# Patient Record
Sex: FEMALE | Race: WHITE | NOT HISPANIC OR LATINO | Employment: FULL TIME | URBAN - METROPOLITAN AREA
[De-identification: names, ages, dates, MRNs, and addresses within clinical notes are randomized per-mention and may not be internally consistent; named-entity substitution may affect disease eponyms.]

---

## 2020-09-14 ENCOUNTER — HOSPITAL ENCOUNTER (OUTPATIENT)
Dept: LAB | Facility: HOSPITAL | Age: 30
Discharge: HOME | End: 2020-09-14
Attending: NURSE PRACTITIONER

## 2020-09-14 LAB
25(OH)D3 SERPL-MCNC: 46.1 NG/ML (ref 30–100)
ALBUMIN SERPL-MCNC: 4.1 G/DL (ref 3.5–5)
ALBUMIN/GLOB SERPL: 1.2 {RATIO} (ref 1.4–2.6)
ALP SERPL-CCNC: 79 U/L (ref 42–98)
ALT SERPL-CCNC: 21 U/L (ref 10–40)
ANION GAP SERPL CALC-SCNC: 18 MMOL/L (ref 8–19)
AST SERPL-CCNC: 24 U/L (ref 15–50)
BASOPHILS # BLD AUTO: 0.02 10*3/UL (ref 0–0.2)
BASOPHILS NFR BLD AUTO: 0.3 % (ref 0–3)
BILIRUB SERPL-MCNC: 0.3 MG/DL (ref 0.2–1.3)
BUN SERPL-MCNC: 11 MG/DL (ref 5–25)
BUN/CREAT SERPL: 14 {RATIO} (ref 6–20)
CALCIUM SERPL-MCNC: 9.3 MG/DL (ref 8.7–10.4)
CHLORIDE SERPL-SCNC: 103 MMOL/L (ref 99–111)
CHOLEST SERPL-MCNC: 204 MG/DL (ref 107–200)
CHOLEST/HDLC SERPL: 5.1 {RATIO} (ref 3–6)
CONV ABS IMM GRAN: 0.02 10*3/UL (ref 0–0.2)
CONV CO2: 22 MMOL/L (ref 22–32)
CONV IMMATURE GRAN: 0.3 % (ref 0–1.8)
CONV TOTAL PROTEIN: 7.5 G/DL (ref 6.3–8.2)
CREAT UR-MCNC: 0.77 MG/DL (ref 0.5–0.9)
DEPRECATED RDW RBC AUTO: 42.6 FL (ref 36.4–46.3)
EOSINOPHIL # BLD AUTO: 0.09 10*3/UL (ref 0–0.7)
EOSINOPHIL # BLD AUTO: 1.2 % (ref 0–7)
ERYTHROCYTE [DISTWIDTH] IN BLOOD BY AUTOMATED COUNT: 13.1 % (ref 11.7–14.4)
EST. AVERAGE GLUCOSE BLD GHB EST-MCNC: 103 MG/DL
FOLATE SERPL-MCNC: 7.7 NG/ML (ref 4.8–20)
GFR SERPLBLD BASED ON 1.73 SQ M-ARVRAT: >60 ML/MIN/{1.73_M2}
GLOBULIN UR ELPH-MCNC: 3.4 G/DL (ref 2–3.5)
GLUCOSE SERPL-MCNC: 85 MG/DL (ref 65–99)
HBA1C MFR BLD: 5.2 % (ref 3.5–5.7)
HCT VFR BLD AUTO: 42.7 % (ref 37–47)
HDLC SERPL-MCNC: 40 MG/DL (ref 40–60)
HGB BLD-MCNC: 13.3 G/DL (ref 12–16)
IRON SERPL-MCNC: 58 UG/DL (ref 60–170)
LDLC SERPL CALC-MCNC: 143 MG/DL (ref 70–100)
LYMPHOCYTES # BLD AUTO: 3.02 10*3/UL (ref 1–5)
LYMPHOCYTES NFR BLD AUTO: 40.4 % (ref 20–45)
MCH RBC QN AUTO: 27.6 PG (ref 27–31)
MCHC RBC AUTO-ENTMCNC: 31.1 G/DL (ref 33–37)
MCV RBC AUTO: 88.6 FL (ref 81–99)
MONOCYTES # BLD AUTO: 0.45 10*3/UL (ref 0.2–1.2)
MONOCYTES NFR BLD AUTO: 6 % (ref 3–10)
NEUTROPHILS # BLD AUTO: 3.87 10*3/UL (ref 2–8)
NEUTROPHILS NFR BLD AUTO: 51.8 % (ref 30–85)
NRBC CBCN: 0 % (ref 0–0.7)
OSMOLALITY SERPL CALC.SUM OF ELEC: 287 MOSM/KG (ref 273–304)
PLATELET # BLD AUTO: 248 10*3/UL (ref 130–400)
PMV BLD AUTO: 11.5 FL (ref 9.4–12.3)
POTASSIUM SERPL-SCNC: 4.3 MMOL/L (ref 3.5–5.3)
RBC # BLD AUTO: 4.82 10*6/UL (ref 4.2–5.4)
SODIUM SERPL-SCNC: 139 MMOL/L (ref 135–147)
TRIGL SERPL-MCNC: 107 MG/DL (ref 40–150)
TSH SERPL-ACNC: 0.26 M[IU]/L (ref 0.27–4.2)
VIT B12 SERPL-MCNC: 475 PG/ML (ref 211–911)
VLDLC SERPL-MCNC: 21 MG/DL (ref 5–37)
WBC # BLD AUTO: 7.47 10*3/UL (ref 4.8–10.8)

## 2020-09-15 ENCOUNTER — HOSPITAL ENCOUNTER (OUTPATIENT)
Dept: LAB | Facility: HOSPITAL | Age: 30
Discharge: HOME | End: 2020-09-15
Attending: NURSE PRACTITIONER

## 2020-10-05 ENCOUNTER — CONVERSION ENCOUNTER (OUTPATIENT)
Dept: SURGERY | Facility: CLINIC | Age: 30
End: 2020-10-05

## 2020-10-05 ENCOUNTER — OFFICE VISIT CONVERTED (OUTPATIENT)
Dept: SURGERY | Facility: CLINIC | Age: 30
End: 2020-10-05
Attending: SURGERY

## 2020-10-10 ENCOUNTER — HOSPITAL ENCOUNTER (OUTPATIENT)
Dept: PREADMISSION TESTING | Facility: HOSPITAL | Age: 30
Discharge: HOME | End: 2020-10-10
Attending: SURGERY

## 2020-10-10 LAB — SARS-COV-2 RNA SPEC QL NAA+PROBE: NOT DETECTED

## 2020-10-15 ENCOUNTER — HOSPITAL ENCOUNTER (OUTPATIENT)
Dept: PERIOP | Facility: HOSPITAL | Age: 30
Setting detail: HOSPITAL OUTPATIENT SURGERY
Discharge: HOME | End: 2020-10-15
Attending: SURGERY

## 2020-10-15 LAB — HCG UR QL: NEGATIVE

## 2020-11-02 ENCOUNTER — OFFICE VISIT CONVERTED (OUTPATIENT)
Dept: SURGERY | Facility: CLINIC | Age: 30
End: 2020-11-02
Attending: SURGERY

## 2021-05-10 NOTE — H&P
History and Physical      Patient Name: Mahesh Barahona   Patient ID: [id number]   Sex: Female   YOB: 1990        Visit Date: 2020    Provider: Husam Pack MD   Location: [facility]   Location Address: [address] [place] [address]  [id number]   Location Phone: [phone]          Chief Complaint  · Outpatient History & Physical / Surgical Orders  · Gallbladder Consult      History Of Present Illness  Mahesh Barahona is a 29 year old /White female who presents to the office today as a consult from [facility] ER Physician MD.      Patient was referred for gallstones.  Patient went to the emergency room on 10/2/2020 for severe epigastric pain and right upper quadrant pain.  She had an abdominal ultrasound done that showed gallstones and a 5.5 mm common bile duct with no obvious choledocholithiasis but there was intrahepatic biliary dilatation.  Labs showed white count at about 12.2 and LFTs were not elevated.  She went home from the emergency room.  She was prescribed pain medications.  She has no longer needing to take the pain medicines.  Patient says that on and off for the past few months she has been having epigastric pain and right upper quadrant pain that would usually go away on its own and 20 to 30 minutes after starting but the day she went to the emergency room the pain was much more severe and lasted longer.  He feels fine right now but is worried the pain is going to return.  Only prior abdominal surgery is  with a transversely oriented incision.       Past Medical History  Disease Name Date Onset Notes   Hypothyroidism --  --    Thyroid disorder --  --          Past Surgical History  Procedure Name Date Notes   Cesarian Section --  --          Medication List  Name Date Started Instructions   Junel Fe 24 1 mg-20 mcg (24)/75 mg (4) oral tablet  take 1 tablet by oral route once daily   Synthroid 125 mcg oral tablet  take 1 tablet (125  "mcg) by oral route once daily         Allergy List  Allergen Name Date Reaction Notes   hydrocodone-acetaminophen --  --  --    Ultram --  --  --        Allergies Reconciled  Family Medical History  Disease Name Relative/Age Notes   Diabetes, unspecified type Father/   Father         Social History  Finding Status Start/Stop Quantity Notes   Alcohol Use Current some day --/-- --  rarely drinks, less than 1 drink per day   lives with spouse --  --/-- --  --    Recreational Drug Use Never --/-- --  no   Tobacco Never --/-- --  never smoker         Review of Systems  · Constitutional  o Denies  o : fever, chills  · Cardiovascular  o Denies  o : chest pain on exertion  · Respiratory  o Denies  o : shortness of breath, cough  · Gastrointestinal  o Admits  o : nausea  o Denies  o : constipation      Vitals  Date Time BP Position Site L\R Cuff Size HR RR TEMP (F) WT  HT  BMI kg/m2 BSA m2 O2 Sat FR L/min FiO2        10/05/2020 09:11 AM       14  258lbs 2oz 5'  7\" 40.43 2.35             Physical Examination  · Constitutional  o Appearance  o : here alone, alert and in no acute distress, reliable historian  · Head and Face  o Head  o :   § Inspection  § : no visable deformities or lesions  · Eyes  o Conjunctivae  o : clear  o Sclerae  o : clear  · Neck  o Inspection/Palpation  o : normal appearance, no masses, trachea midline  · Respiratory  o Respiratory Effort  o : breathing unlabored, respiratory effort appears normal  o Inspection of Chest  o : normal appearance, no retractions  · Cardiovascular  o Heart  o : regular rate and rhythm  · Gastrointestinal  o Abdominal Examination  o :   § Abdomen  § : soft, nontender, nondistended  · Skin and Subcutaneous Tissue  o General Inspection  o : no visible concerning rashes or lesions present  · Neurologic  o Cranial Nerves  o : no obvious motor deficits  o Sensation  o : no obvious sensory deficits  o Gait and Station  o :   § Gait Screening  § : normal gait, able to stand " without diffculty  o Cerebellar Function  o : no obvious abnormalities  · Psychiatric  o Judgement and Insight  o : judgment and insight intact  o Mood and Affect  o : mood normal, affect appropriate          Assessment  · Pre-Surgical Orders     V72.84  · Symptomatic cholelithiasis     574.20/K80.20  · Preop testing     V72.84/Z01.818      Plan  · Orders  o GENERAL SURGERY (GNSUR) - V72.84 - 10/15/2020  o [facility] Pre-Op Covid-19 Screening (14005) - V72.84/Z01.818 - 10/10/2020   at 845am  · Medications  o Medications have been Reconciled  o Transition of Care or Provider Policy  · Instructions  o PLAN: Laparoscopic Cholecystectomy with Intraoperative Cholangiogram.  o PLEASE SIGN PERMIT FOR: Laparoscopic Cholecystectomy with Intraoperative Cholangiogram  o Anesthesia: General   o Outpatient  o O.R. PREP: Per protocol  o IV: Per Anesthesia  o SCD's preoperatively  o No antibiotic is needed.  o Beta HCG urine to be done the morning of surgery.  o The indications, options, risks, benefits, and expected outcomes of the planned procedure were discussed with the patient and the patient agrees to proceed.   o Electronically Identified Patient Education Materials Provided Electronically            Electronically Signed by: Husam Pack MD -Author on October 5, 2020 10:03:40 AM

## 2021-05-13 NOTE — PROGRESS NOTES
"   Progress Note      Patient Name: Mahesh Barahona   Patient ID: [id number]   Sex: Female   YOB: 1990    Primary Care Provider: Nishant Conway MD   Referring Provider: Nishant Conway MD    Visit Date: November 2, 2020    Provider: Husam Pack MD   Location: [facility]   Location Address: [address] [place] [address]  [id number]   Location Phone: [phone]          Chief Complaint  · Follow up Surgery      History Of Present Illness  Mahesh Barahona is a 29 year old /White female who presents today for a postoperative visit.      Patient is here for follow-up after laparoscopic gallbladder removal for symptomatic gallstones.  She says she feels great.  No complaints.  Abdominal exam is benign.  My assessment is the patient is recovering very well after her surgery.  She seems pleased with her progress thus far.  No new issues to address.  Patient will see me PRN.       Vitals  Date Time BP Position Site L\R Cuff Size HR RR TEMP (F) WT  HT  BMI kg/m2 BSA m2 O2 Sat FR L/min FiO2 HC       11/02/2020 09:59 AM       12  253lbs 2oz 5'  7\" 39.64 2.33                 Assessment  · Postoperative Exam Following Surgery     V67.00      Plan  · Medications  o Medications have been Reconciled  o Transition of Care or Provider Policy  · Instructions  o See Above HPI section.  o Electronically Identified Patient Education Materials Provided Electronically            Electronically Signed by: Husam Pack MD -Author on November 2, 2020 10:01:18 AM  "

## 2021-05-14 VITALS — WEIGHT: 253.12 LBS | HEIGHT: 67 IN | RESPIRATION RATE: 12 BRPM | BODY MASS INDEX: 39.73 KG/M2

## 2021-05-14 VITALS — WEIGHT: 258.12 LBS | HEIGHT: 67 IN | RESPIRATION RATE: 14 BRPM | BODY MASS INDEX: 40.51 KG/M2

## 2024-03-05 PROCEDURE — 87186 SC STD MICRODIL/AGAR DIL: CPT | Performed by: NURSE PRACTITIONER

## 2024-03-05 PROCEDURE — 87077 CULTURE AEROBIC IDENTIFY: CPT | Performed by: NURSE PRACTITIONER

## 2024-03-05 PROCEDURE — 87086 URINE CULTURE/COLONY COUNT: CPT | Performed by: NURSE PRACTITIONER

## 2024-03-07 ENCOUNTER — TELEPHONE (OUTPATIENT)
Dept: URGENT CARE | Facility: CLINIC | Age: 34
End: 2024-03-07
Payer: COMMERCIAL

## 2024-03-07 NOTE — TELEPHONE ENCOUNTER
----- Message from LAURY Bolaños sent at 3/7/2024 12:25 PM EST -----  Please call the patient regarding her urine culture results.  Her urine grew E. coli.  She does have a UTI.  I have sent in Macrobid to her pharmacy for treatment.  She should increase fluid intake and finish the entirety of the antibiotic.  Follow-up with PCP as needed.